# Patient Record
Sex: MALE | Race: WHITE | NOT HISPANIC OR LATINO | Employment: OTHER | URBAN - METROPOLITAN AREA
[De-identification: names, ages, dates, MRNs, and addresses within clinical notes are randomized per-mention and may not be internally consistent; named-entity substitution may affect disease eponyms.]

---

## 2017-04-17 ENCOUNTER — APPOINTMENT (OUTPATIENT)
Dept: ADMISSIONS | Facility: MEDICAL CENTER | Age: 62
End: 2017-04-17
Attending: ORTHOPAEDIC SURGERY
Payer: COMMERCIAL

## 2017-04-17 RX ORDER — ACETAMINOPHEN 500 MG
500-1000 TABLET ORAL
COMMUNITY

## 2017-04-17 RX ORDER — LORATADINE 10 MG/1
10 CAPSULE, LIQUID FILLED ORAL
COMMUNITY
End: 2021-06-17

## 2017-04-20 ENCOUNTER — HOSPITAL ENCOUNTER (OUTPATIENT)
Facility: MEDICAL CENTER | Age: 62
End: 2017-04-20
Attending: ORTHOPAEDIC SURGERY | Admitting: ORTHOPAEDIC SURGERY
Payer: COMMERCIAL

## 2017-04-20 VITALS
RESPIRATION RATE: 16 BRPM | HEART RATE: 66 BPM | DIASTOLIC BLOOD PRESSURE: 97 MMHG | BODY MASS INDEX: 31.82 KG/M2 | SYSTOLIC BLOOD PRESSURE: 151 MMHG | HEIGHT: 73 IN | OXYGEN SATURATION: 99 % | WEIGHT: 240.08 LBS | TEMPERATURE: 96.8 F

## 2017-04-20 PROBLEM — G56.02 CARPAL TUNNEL SYNDROME ON LEFT: Status: ACTIVE | Noted: 2017-04-20

## 2017-04-20 PROCEDURE — 160048 HCHG OR STATISTICAL LEVEL 1-5: Performed by: ORTHOPAEDIC SURGERY

## 2017-04-20 PROCEDURE — A9270 NON-COVERED ITEM OR SERVICE: HCPCS

## 2017-04-20 PROCEDURE — 500070 HCHG BLADE, AGEE CARPAL TUNNEL: Performed by: ORTHOPAEDIC SURGERY

## 2017-04-20 PROCEDURE — 700111 HCHG RX REV CODE 636 W/ 250 OVERRIDE (IP): Performed by: ORTHOPAEDIC SURGERY

## 2017-04-20 PROCEDURE — 500761 HCHG KIT, CARPAL TUNNEL ENDOSCOPIC: Performed by: ORTHOPAEDIC SURGERY

## 2017-04-20 PROCEDURE — 110371 HCHG SHELL REV 272: Performed by: ORTHOPAEDIC SURGERY

## 2017-04-20 PROCEDURE — 160029 HCHG SURGERY MINUTES - 1ST 30 MINS LEVEL 4: Performed by: ORTHOPAEDIC SURGERY

## 2017-04-20 PROCEDURE — 700101 HCHG RX REV CODE 250

## 2017-04-20 PROCEDURE — 160046 HCHG PACU - 1ST 60 MINS PHASE II: Performed by: ORTHOPAEDIC SURGERY

## 2017-04-20 PROCEDURE — 501838 HCHG SUTURE GENERAL: Performed by: ORTHOPAEDIC SURGERY

## 2017-04-20 PROCEDURE — 110382 HCHG SHELL REV 271: Performed by: ORTHOPAEDIC SURGERY

## 2017-04-20 PROCEDURE — 700111 HCHG RX REV CODE 636 W/ 250 OVERRIDE (IP)

## 2017-04-20 PROCEDURE — 160041 HCHG SURGERY MINUTES - EA ADDL 1 MIN LEVEL 4: Performed by: ORTHOPAEDIC SURGERY

## 2017-04-20 PROCEDURE — 160035 HCHG PACU - 1ST 60 MINS PHASE I: Performed by: ORTHOPAEDIC SURGERY

## 2017-04-20 PROCEDURE — 160002 HCHG RECOVERY MINUTES (STAT): Performed by: ORTHOPAEDIC SURGERY

## 2017-04-20 PROCEDURE — 700102 HCHG RX REV CODE 250 W/ 637 OVERRIDE(OP)

## 2017-04-20 PROCEDURE — 502576 HCHG PACK, HAND: Performed by: ORTHOPAEDIC SURGERY

## 2017-04-20 PROCEDURE — 160009 HCHG ANES TIME/MIN: Performed by: ORTHOPAEDIC SURGERY

## 2017-04-20 PROCEDURE — 160025 RECOVERY II MINUTES (STATS): Performed by: ORTHOPAEDIC SURGERY

## 2017-04-20 PROCEDURE — 501480 HCHG STOCKINETTE: Performed by: ORTHOPAEDIC SURGERY

## 2017-04-20 PROCEDURE — A4606 OXYGEN PROBE USED W OXIMETER: HCPCS | Performed by: ORTHOPAEDIC SURGERY

## 2017-04-20 RX ORDER — SODIUM CHLORIDE, SODIUM LACTATE, POTASSIUM CHLORIDE, CALCIUM CHLORIDE 600; 310; 30; 20 MG/100ML; MG/100ML; MG/100ML; MG/100ML
INJECTION, SOLUTION INTRAVENOUS
Status: DISCONTINUED | OUTPATIENT
Start: 2017-04-20 | End: 2017-04-20 | Stop reason: HOSPADM

## 2017-04-20 RX ORDER — OXYCODONE HCL 5 MG/5 ML
SOLUTION, ORAL ORAL
Status: COMPLETED
Start: 2017-04-20 | End: 2017-04-20

## 2017-04-20 RX ORDER — BUPIVACAINE HYDROCHLORIDE 5 MG/ML
INJECTION, SOLUTION EPIDURAL; INTRACAUDAL
Status: DISCONTINUED | OUTPATIENT
Start: 2017-04-20 | End: 2017-04-20 | Stop reason: HOSPADM

## 2017-04-20 RX ORDER — LIDOCAINE HYDROCHLORIDE 10 MG/ML
INJECTION, SOLUTION INFILTRATION; PERINEURAL
Status: COMPLETED
Start: 2017-04-20 | End: 2017-04-20

## 2017-04-20 RX ADMIN — OXYCODONE HYDROCHLORIDE 10 MG: 5 SOLUTION ORAL at 08:40

## 2017-04-20 RX ADMIN — SODIUM CHLORIDE, POTASSIUM CHLORIDE, SODIUM LACTATE AND CALCIUM CHLORIDE: 600; 310; 30; 20 INJECTION, SOLUTION INTRAVENOUS at 06:55

## 2017-04-20 RX ADMIN — LIDOCAINE HYDROCHLORIDE 0.2 ML: 10 INJECTION, SOLUTION INFILTRATION; PERINEURAL at 06:55

## 2017-04-20 ASSESSMENT — PAIN SCALES - GENERAL
PAINLEVEL_OUTOF10: 0
PAINLEVEL_OUTOF10: ASSUMED PAIN PRESENT
PAINLEVEL_OUTOF10: 0

## 2017-04-20 NOTE — OR NURSING
0900 patient to stage 2  Patient settled in recliner chair post short ambulation from irais - pt dressed with assist by CNA. Dressing to L hand CDI and pt putting on socks and shoes with bilat hands even after RN educating about elevating L hand. Pt states understanding but continues to use bilateral hands even after offer of assistance from RN/CNA to put on shoes. Denies pain and nausea.   0910 Dr Wallace here and instruction patient to keep L hand elevated; pt demonstrates understanding.

## 2017-04-20 NOTE — OR NURSING
0802 received from or  Oral airway intact  resp spont  Left hand dressing c/d/i  Fingers warm  Cap refill<3 sec    Elevated  Ice pack applied  0840  Meets discharge criteria

## 2017-04-20 NOTE — OP REPORT
DATE OF SERVICE:  04/20/2017    PREOPERATIVE DIAGNOSIS:  Left carpal tunnel syndrome with thenar atrophy.    POSTOPERATIVE DIAGNOSIS:  Left carpal tunnel syndrome with thenar atrophy.    PROCEDURES PERFORMED:  Left endoscopic carpal tunnel release and wrist block.    SURGEON:  Reji Wallace MD    ANESTHESIOLOGIST:  Aaron Olmedo MD    ANESTHESIA:  General LMA.    COMPLICATIONS:  None.    DRAINS:  None.    SPECIMENS:  None.    TOURNIQUET TIME:  9 minutes at 250 mmHg.    ESTIMATED BLOOD LOSS:  None.    FLUIDS:  Crystalloid and Kefzol.    INDICATIONS:  The patient has had ongoing problems with carpal tunnel.  He is   conservatively without success.  Positive EMGs.  The risks, benefits, and   options were discussed and wished to undergo the above procedure.    FINDINGS:  Noted to be a tight transcarpal ligament.  This was well visualized   completely released.  Distal forearm fascia was completely released and was   noted to be quite thickened.    PROCEDURE IN DETAIL:  The patient was brought to operating room, general   anesthesia was induced, all pressure points well padded.  Left upper extremity   was prepped and draped in usual fashion.  Timeout was called.    The arm was exsanguinated with Esmarch and tourniquet inflated.    Over the proximal wrist crease, sharp dissection was carried through skin and   blunt excision of subcutaneous tissues.  Adequate hemostasis was obtained with   electrocautery.    Distally based flap was made at transcarpal ligament.    Using the Mann elevator, dilator and scope, transcarpal ligament was   visualized.  There was initially some synovial tissue still radially.  This is   the elevator was then used to remove this and releases.  Staying ulnar,   multiple passes were made getting complete release of the transcarpal ligament   with marked gaping of the 2 cut edges.    This was probed and noted to be completely released.    The flap was excised.  Proximally blunt excision was carried  out superficial   and deep to the forearm fascia.  This was cut under direct visualization,   probed and noted to be completely released.    The wound was copiously irrigated.  A running 4-0 subcuticular stitch was   placed.  Steri-Strips were applied.  Proximally, wrist blocks placed in the   median and ulnar nerves 0.5% Marcaine with epinephrine.    Bulky dressing was applied.  Tourniquet was released.  The patient brought to   recovery in stable condition, tolerated, no complications.  The plan is for   the patient to be elevating his left upper extremity, remove his dressing in 3   days.  Place a Band-Aid over the wound.  Keep the wound clean and dry until   the sutures removed.       ____________________________________     MD LUKE BOOTH / JUSTINA    DD:  04/20/2017 08:10:37  DT:  04/20/2017 09:28:57    D#:  [id number]  Job#:  [id number]

## 2017-04-20 NOTE — IP AVS SNAPSHOT
4/20/2017    Triston Nelson  [address] [place] [address]    Dear Triston:    Counts include 234 beds at the [facility] wants to ensure your discharge home is safe and you or your loved ones have had all of your questions answered regarding your care after you leave the hospital.    Below is a list of resources and contact information should you have any questions regarding your hospital stay, follow-up instructions, or active medical symptoms.    Questions or Concerns Regarding… Contact   Medical Questions Related to Your Discharge  (7 days a week, 8am-5pm) Contact a Nurse Care Coordinator   [phone]   Medical Questions Not Related to Your Discharge  (24 hours a day / 7 days a week)  Contact the Nurse Health Line   [phone]    Medications or Discharge Instructions Refer to your discharge packet   or contact your [facility] Primary Care Provider   [phone]   Follow-up Appointment(s) Schedule your appointment via Penxy   or contact Scheduling [phone]   Billing Review your statement via Penxy  or contact Billing [phone]   Medical Records Review your records via Penxy   or contact Medical Records [phone]     You may receive a telephone call within two days of discharge. This call is to make certain you understand your discharge instructions and have the opportunity to have any questions answered. You can also easily access your medical information, test results and upcoming appointments via the Penxy free online health management tool. You can learn more and sign up at [employer] [place]/Penxy. For assistance setting up your Penxy account, please call [phone].    Once again, we want to ensure your discharge home is safe and that you have a clear understanding of any next steps in your care. If you have any questions or concerns, please do not hesitate to contact us, we are here for you. Thank you for choosing [facility] for your healthcare needs.    Sincerely,    Your [facility] Healthcare Team

## 2017-04-20 NOTE — OR NURSING
0630 PT TO PRE OP TO ASSUME CARE 0610 Patient allergies and NPO status verified, home medication reconciliation completed and belongings secured. Patient verbalizes understanding of pain scale, expected course of stay and plan of care. Surgical site verified with patient. IV access established.

## 2017-04-20 NOTE — IP AVS SNAPSHOT
" Home Care Instructions                                                                                                                Name:Triston Nelson  Medical Record Number:[id number]  CSN: [id number]    YOB: 1955   Age: 62 y.o.  Sex: male  HT:1.854 m (6' 1\") WT: 108.9 kg (240 lb 1.3 oz)          Admit Date: 4/20/2017     Discharge Date:   Today's Date: 4/20/2017  Attending Doctor:  Reji Wallace M.D.                  Allergies:  Shellfish allergy                Discharge Instructions         ACTIVITY: Rest and take it easy for the first 24 hours.  A responsible adult is recommended to remain with you during that time.  It is normal to feel sleepy.  We encourage you to not do anything that requires balance, judgment or coordination.    MILD FLU-LIKE SYMPTOMS ARE NORMAL. YOU MAY EXPERIENCE GENERALIZED MUSCLE ACHES, THROAT IRRITATION, HEADACHE AND/OR SOME NAUSEA.    FOR 24 HOURS DO NOT:  Drive, operate machinery or run household appliances.  Drink beer or alcoholic beverages.   Make important decisions or sign legal documents.    SPECIAL INSTRUCTIONS:   Move fingers in dressing  Keep operative upper extremity elevated  Keep clean and dry  May remove dressing and place bandaid on post op day 3              DIET: To avoid nausea, slowly advance diet as tolerated, avoiding spicy or greasy foods for the first day.  Add more substantial food to your diet according to your physician's instructions.  Babies can be fed formula or breast milk as soon as they are hungry.  INCREASE FLUIDS AND FIBER TO AVOID CONSTIPATION.        FOLLOW-UP APPOINTMENT:  A follow-up appointment should be arranged with your doctor in  Call to schedule.    You should CALL YOUR PHYSICIAN if you develop:  Fever greater than 101 degrees F.  Pain not relieved by medication, or persistent nausea or vomiting.  Excessive bleeding (blood soaking through dressing) or unexpected drainage from the wound.  Extreme redness or swelling around the " incision site, drainage of pus or foul smelling drainage.  Inability to urinate or empty your bladder within 8 hours.  Problems with breathing or chest pain.    You should call 911 if you develop problems with breathing or chest pain.  If you are unable to contact your doctor or surgical center, you should go to the nearest emergency room or urgent care center.  Physician's telephone #: [phone]    If any questions arise, call your doctor.  If your doctor is not available, please feel free to call the Surgical Center at [phone].  The Center is open Monday through Friday from 7AM to 7PM.  You can also call the [facility] HOTLINE open 24 hours/day, 7 days/week and speak to a nurse at [phone], or toll free at [phone].    A registered nurse may call you a few days after your surgery to see how you are doing after your procedure.    MEDICATIONS: Resume taking daily medication.  Take prescribed pain medication with food.  If no medication is prescribed, you may take non-aspirin pain medication if needed.  PAIN MEDICATION CAN BE VERY CONSTIPATING.  Take a stool softener or laxative such as senokot, pericolace, or milk of magnesia if needed.    Prescription at home  Last pain medication given at     If your physician has prescribed pain medication that includes Acetaminophen (Tylenol), do not take additional Acetaminophen (Tylenol) while taking the prescribed medication.    Depression / Suicide Risk    As you are discharged from this [facility] facility, it is important to learn how to keep safe from harming yourself.    Recognize the warning signs:  · Abrupt changes in personality, positive or negative- including increase in energy   · Giving away possessions  · Change in eating patterns- significant weight changes-  positive or negative  · Change in sleeping patterns- unable to sleep or sleeping all the time   · Unwillingness or inability to communicate  · Depression  · Unusual sadness, discouragement  and loneliness  · Talk of wanting to die  · Neglect of personal appearance   · Rebelliousness- reckless behavior  · Withdrawal from people/activities they love  · Confusion- inability to concentrate     If you or a loved one observes any of these behaviors or has concerns about self-harm, here's what you can do:  · Talk about it- your feelings and reasons for harming yourself  · Remove any means that you might use to hurt yourself (examples: pills, rope, extension cords, firearm)  · Get professional help from the community (Mental Health, Substance Abuse, psychological counseling)  · Do not be alone:Call your Safe Contact- someone whom you trust who will be there for you.  · Call your local CRISIS HOTLINE 136-3500 or 260-547-6145  · Call your local Children's Mobile Crisis Response Team [place] (174) 124-4329 or www.[employer]  · Call the toll free National Suicide Prevention Hotlines   · National Suicide Prevention Lifeline 805-569-LXMO (9106)  · Agent Partner Line Network 800-SUICIDE (301-2535)       Medication List      ASK your doctor about these medications        Instructions    Morning Afternoon Evening Bedtime    acetaminophen 500 MG Tabs   Commonly known as:  TYLENOL        Take 500-1,000 mg by mouth every bedtime.   Dose:  500-1000 mg                        EQ SUPHEDRINE PE PO        Take  by mouth.                        Loratadine 10 MG Caps        Take 10 mg by mouth.   Dose:  10 mg                        Potassium 99 MG Tabs        Take 99 mg by mouth every day.   Dose:  99 mg                                Medication Information     Above and/or attached are the medications your physician expects you to take upon discharge. Review all of your home medications and newly ordered medications with your doctor and/or pharmacist. Follow medication instructions as directed by your doctor and/or pharmacist. Please keep your medication list with you and share with your physician. Update the  information when medications are discontinued, doses are changed, or new medications (including over-the-counter products) are added; and carry medication information at all times in the event of emergency situations.        Resources     Quit Smoking / Tobacco Use:    I understand the use of any tobacco products increases my chance of suffering from future heart disease or stroke and could cause other illnesses which may shorten my life. Quitting the use of tobacco products is the single most important thing I can do to improve my health. For further information on smoking / tobacco cessation call a Toll Free Quit Line at 1-734.105.7301 (*[employer] [place]) or 1-237.520.5107 (American Lung Association) or you can access the web based program at www.lungusa.org.    [place] Tobacco Users Help Line:  (216) 438-7020       Toll Free: 1-392.784.9204  Quit Tobacco Program [facility] (521)027-2939    Crisis Hotline:    [place] Crisis Hotline:  4-651-PAPUAFW or 1-894.836.8380    [place] Crisis Hotline:    1-365.963.9872 or 769-536-9598    Discharge Survey:   Thank you for choosing [facility]. We hope we did everything we could to make your hospital stay a pleasant one. You may be receiving a survey and we would appreciate your time and participation in answering the questions. Your input is very valuable to us in our efforts to improve our service to our patients and their families.            Signatures     My signature on this form indicates that:    1. I acknowledge receipt and understanding of these Home Care Instruction.  2. My questions regarding this information have been answered to my satisfaction.  3. I have formulated a plan with my discharge nurse to obtain my prescribed medications for home.    __________________________________      __________________________________                   Patient Signature                                 Guardian/Responsible Adult  Signature      __________________________________                 __________       ________                       Nurse Signature                                               Date                 Time

## 2017-04-20 NOTE — DISCHARGE INSTRUCTIONS
ACTIVITY: Rest and take it easy for the first 24 hours.  A responsible adult is recommended to remain with you during that time.  It is normal to feel sleepy.  We encourage you to not do anything that requires balance, judgment or coordination.    MILD FLU-LIKE SYMPTOMS ARE NORMAL. YOU MAY EXPERIENCE GENERALIZED MUSCLE ACHES, THROAT IRRITATION, HEADACHE AND/OR SOME NAUSEA.    FOR 24 HOURS DO NOT:  Drive, operate machinery or run household appliances.  Drink beer or alcoholic beverages.   Make important decisions or sign legal documents.    SPECIAL INSTRUCTIONS:   Move fingers in dressing  Keep operative upper extremity elevated  Keep clean and dry  May remove dressing and place bandaid on post op day 3              DIET: To avoid nausea, slowly advance diet as tolerated, avoiding spicy or greasy foods for the first day.  Add more substantial food to your diet according to your physician's instructions.  Babies can be fed formula or breast milk as soon as they are hungry.  INCREASE FLUIDS AND FIBER TO AVOID CONSTIPATION.        FOLLOW-UP APPOINTMENT:  A follow-up appointment should be arranged with your doctor in  Call to schedule.    You should CALL YOUR PHYSICIAN if you develop:  Fever greater than 101 degrees F.  Pain not relieved by medication, or persistent nausea or vomiting.  Excessive bleeding (blood soaking through dressing) or unexpected drainage from the wound.  Extreme redness or swelling around the incision site, drainage of pus or foul smelling drainage.  Inability to urinate or empty your bladder within 8 hours.  Problems with breathing or chest pain.    You should call 911 if you develop problems with breathing or chest pain.  If you are unable to contact your doctor or surgical center, you should go to the nearest emergency room or urgent care center.  Physician's telephone #: [phone]    If any questions arise, call your doctor.  If your doctor is not available, please feel free to call the  Surgical Center at [phone].  The Center is open Monday through Friday from 7AM to 7PM.  You can also call the HEALTH HOTLINE open 24 hours/day, 7 days/week and speak to a nurse at [phone], or toll free at [phone].    A registered nurse may call you a few days after your surgery to see how you are doing after your procedure.    MEDICATIONS: Resume taking daily medication.  Take prescribed pain medication with food.  If no medication is prescribed, you may take non-aspirin pain medication if needed.  PAIN MEDICATION CAN BE VERY CONSTIPATING.  Take a stool softener or laxative such as senokot, pericolace, or milk of magnesia if needed.    Prescription at home  Last pain medication given at     If your physician has prescribed pain medication that includes Acetaminophen (Tylenol), do not take additional Acetaminophen (Tylenol) while taking the prescribed medication.    Depression / Suicide Risk    As you are discharged from this [facility] facility, it is important to learn how to keep safe from harming yourself.    Recognize the warning signs:  · Abrupt changes in personality, positive or negative- including increase in energy   · Giving away possessions  · Change in eating patterns- significant weight changes-  positive or negative  · Change in sleeping patterns- unable to sleep or sleeping all the time   · Unwillingness or inability to communicate  · Depression  · Unusual sadness, discouragement and loneliness  · Talk of wanting to die  · Neglect of personal appearance   · Rebelliousness- reckless behavior  · Withdrawal from people/activities they love  · Confusion- inability to concentrate     If you or a loved one observes any of these behaviors or has concerns about self-harm, here's what you can do:  · Talk about it- your feelings and reasons for harming yourself  · Remove any means that you might use to hurt yourself (examples: pills, rope, extension cords, firearm)  · Get professional  help from the community (Mental Health, Substance Abuse, psychological counseling)  · Do not be alone:Call your Safe Contact- someone whom you trust who will be there for you.  · Call your local CRISIS HOTLINE 937-7150 or 430-401-9718  · Call your local Children's Mobile Crisis Response Team [place] (664) 339-9249 or www.[employer]  · Call the toll free National Suicide Prevention Hotlines   · National Suicide Prevention Lifeline 199-093-ASAR (3919)  · National Hope Line Network 800-SUICIDE (354-9892)

## 2017-07-13 ENCOUNTER — APPOINTMENT (OUTPATIENT)
Dept: ADMISSIONS | Facility: MEDICAL CENTER | Age: 62
End: 2017-07-13
Attending: ORTHOPAEDIC SURGERY
Payer: COMMERCIAL

## 2017-07-18 ENCOUNTER — HOSPITAL ENCOUNTER (OUTPATIENT)
Facility: MEDICAL CENTER | Age: 62
End: 2017-07-18
Attending: ORTHOPAEDIC SURGERY | Admitting: ORTHOPAEDIC SURGERY
Payer: COMMERCIAL

## 2017-07-18 VITALS
OXYGEN SATURATION: 98 % | BODY MASS INDEX: 32.43 KG/M2 | TEMPERATURE: 97.5 F | WEIGHT: 244.71 LBS | RESPIRATION RATE: 16 BRPM | HEIGHT: 73 IN | HEART RATE: 59 BPM

## 2017-07-18 PROBLEM — G56.01 CARPAL TUNNEL SYNDROME ON RIGHT: Status: ACTIVE | Noted: 2017-07-18

## 2017-07-18 PROCEDURE — 700101 HCHG RX REV CODE 250

## 2017-07-18 PROCEDURE — 160009 HCHG ANES TIME/MIN: Performed by: ORTHOPAEDIC SURGERY

## 2017-07-18 PROCEDURE — 160048 HCHG OR STATISTICAL LEVEL 1-5: Performed by: ORTHOPAEDIC SURGERY

## 2017-07-18 PROCEDURE — 500070 HCHG BLADE, AGEE CARPAL TUNNEL: Performed by: ORTHOPAEDIC SURGERY

## 2017-07-18 PROCEDURE — 500881 HCHG PACK, EXTREMITY: Performed by: ORTHOPAEDIC SURGERY

## 2017-07-18 PROCEDURE — 160036 HCHG PACU - EA ADDL 30 MINS PHASE I: Performed by: ORTHOPAEDIC SURGERY

## 2017-07-18 PROCEDURE — 500126 HCHG BOVIE, NEEDLE TIP: Performed by: ORTHOPAEDIC SURGERY

## 2017-07-18 PROCEDURE — 502576 HCHG PACK, HAND: Performed by: ORTHOPAEDIC SURGERY

## 2017-07-18 PROCEDURE — 501838 HCHG SUTURE GENERAL: Performed by: ORTHOPAEDIC SURGERY

## 2017-07-18 PROCEDURE — 160029 HCHG SURGERY MINUTES - 1ST 30 MINS LEVEL 4: Performed by: ORTHOPAEDIC SURGERY

## 2017-07-18 PROCEDURE — 160002 HCHG RECOVERY MINUTES (STAT): Performed by: ORTHOPAEDIC SURGERY

## 2017-07-18 PROCEDURE — 302135 SEQUENTIAL COMPRESSION MACHINE: Performed by: ORTHOPAEDIC SURGERY

## 2017-07-18 PROCEDURE — 700111 HCHG RX REV CODE 636 W/ 250 OVERRIDE (IP)

## 2017-07-18 PROCEDURE — 160041 HCHG SURGERY MINUTES - EA ADDL 1 MIN LEVEL 4: Performed by: ORTHOPAEDIC SURGERY

## 2017-07-18 PROCEDURE — 501480 HCHG STOCKINETTE: Performed by: ORTHOPAEDIC SURGERY

## 2017-07-18 PROCEDURE — 160035 HCHG PACU - 1ST 60 MINS PHASE I: Performed by: ORTHOPAEDIC SURGERY

## 2017-07-18 RX ORDER — LIDOCAINE HYDROCHLORIDE 10 MG/ML
INJECTION, SOLUTION INFILTRATION; PERINEURAL
Status: COMPLETED
Start: 2017-07-18 | End: 2017-07-18

## 2017-07-18 RX ORDER — BUPIVACAINE HYDROCHLORIDE 5 MG/ML
INJECTION, SOLUTION EPIDURAL; INTRACAUDAL
Status: DISCONTINUED | OUTPATIENT
Start: 2017-07-18 | End: 2017-07-18 | Stop reason: HOSPADM

## 2017-07-18 RX ORDER — SODIUM CHLORIDE, SODIUM LACTATE, POTASSIUM CHLORIDE, CALCIUM CHLORIDE 600; 310; 30; 20 MG/100ML; MG/100ML; MG/100ML; MG/100ML
1000 INJECTION, SOLUTION INTRAVENOUS
Status: DISCONTINUED | OUTPATIENT
Start: 2017-07-18 | End: 2017-07-18 | Stop reason: HOSPADM

## 2017-07-18 RX ADMIN — SODIUM CHLORIDE, SODIUM LACTATE, POTASSIUM CHLORIDE, CALCIUM CHLORIDE 1000 ML: 600; 310; 30; 20 INJECTION, SOLUTION INTRAVENOUS at 06:10

## 2017-07-18 RX ADMIN — LIDOCAINE HYDROCHLORIDE 0.1 ML: 10 INJECTION, SOLUTION INFILTRATION; PERINEURAL at 06:10

## 2017-07-18 ASSESSMENT — PAIN SCALES - GENERAL
PAINLEVEL_OUTOF10: 0

## 2017-07-18 NOTE — OR NURSING
0802 received from or  resp spont oral airway dc'd  Fingers warm  Cap refill<3 sec  Good movement  No c/o [id number] remains painfree  Meets discharge criteria RN

## 2017-07-18 NOTE — IP AVS SNAPSHOT
" Home Care Instructions                                                                                                                Name:Triston Nelson  Medical Record Number:[id number]  CSN: [id number]    YOB: 1955   Age: 62 y.o.  Sex: male  HT:1.854 m (6' 0.99\") WT: 111 kg (244 lb 11.4 oz)          Admit Date: 7/18/2017     Discharge Date:   Today's Date: 7/18/2017  Attending Doctor:  Reji Wallace M.D.                  Allergies:  Shellfish allergy                Discharge Instructions         ACTIVITY: Rest and take it easy for the first 24 hours.  A responsible adult is recommended to remain with you during that time.  It is normal to feel sleepy.  We encourage you to not do anything that requires balance, judgment or coordination.    MILD FLU-LIKE SYMPTOMS ARE NORMAL. YOU MAY EXPERIENCE GENERALIZED MUSCLE ACHES, THROAT IRRITATION, HEADACHE AND/OR SOME NAUSEA.    FOR 24 HOURS DO NOT:  Drive, operate machinery or run household appliances.  Drink beer or alcoholic beverages.   Make important decisions or sign legal documents.    SPECIAL INSTRUCTIONS: Move fingers in dressing   Keep operative upper extremity elevated   Keep clean and dry   May remove dressing and place bandaid on post op day     DIET: To avoid nausea, slowly advance diet as tolerated, avoiding spicy or greasy foods for the first day.  Add more substantial food to your diet according to your physician's instructions.  Babies can be fed formula or breast milk as soon as they are hungry.  INCREASE FLUIDS AND FIBER TO AVOID CONSTIPATION.      FOLLOW-UP APPOINTMENT:  A follow-up appointment should be arranged with your doctor in ***; call to schedule.    You should CALL YOUR PHYSICIAN if you develop:  Fever greater than 101 degrees F.  Pain not relieved by medication, or persistent nausea or vomiting.  Excessive bleeding (blood soaking through dressing) or unexpected drainage from the wound.  Extreme redness or swelling around the " incision site, drainage of pus or foul smelling drainage.  Inability to urinate or empty your bladder within 8 hours.  Problems with breathing or chest pain.    You should call 911 if you develop problems with breathing or chest pain.  If you are unable to contact your doctor or surgical center, you should go to the nearest emergency room or urgent care center.  Physician's telephone #: [phone]    If any questions arise, call your doctor.  If your doctor is not available, please feel free to call the Surgical Center at [phone].  The Center is open Monday through Friday from 7AM to 7PM.  You can also call the [facility] HOTLINE open 24 hours/day, 7 days/week and speak to a nurse at [phone], or toll free at [phone].    A registered nurse may call you a few days after your surgery to see how you are doing after your procedure.    MEDICATIONS: Resume taking daily medication.  Take prescribed pain medication with food.  If no medication is prescribed, you may take non-aspirin pain medication if needed.  PAIN MEDICATION CAN BE VERY CONSTIPATING.  Take a stool softener or laxative such as senokot, pericolace, or milk of magnesia if needed.    Prescription at home.  Last pain medication given at     If your physician has prescribed pain medication that includes Acetaminophen (Tylenol), do not take additional Acetaminophen (Tylenol) while taking the prescribed medication.    Depression / Suicide Risk    As you are discharged from this [facility] facility, it is important to learn how to keep safe from harming yourself.    Recognize the warning signs:  · Abrupt changes in personality, positive or negative- including increase in energy   · Giving away possessions  · Change in eating patterns- significant weight changes-  positive or negative  · Change in sleeping patterns- unable to sleep or sleeping all the time   · Unwillingness or inability to communicate  · Depression  · Unusual sadness, discouragement  and loneliness  · Talk of wanting to die  · Neglect of personal appearance   · Rebelliousness- reckless behavior  · Withdrawal from people/activities they love  · Confusion- inability to concentrate     If you or a loved one observes any of these behaviors or has concerns about self-harm, here's what you can do:  · Talk about it- your feelings and reasons for harming yourself  · Remove any means that you might use to hurt yourself (examples: pills, rope, extension cords, firearm)  · Get professional help from the community (Mental Health, Substance Abuse, psychological counseling)  · Do not be alone:Call your Safe Contact- someone whom you trust who will be there for you.  · Call your local CRISIS HOTLINE 316-0555 or 284-155-0010  · Call your local Children's Mobile Crisis Response Team [place] (697) 572-1984 or www.[employer]  · Call the toll free National Suicide Prevention Hotlines   · National Suicide Prevention Lifeline 581-095-GTBL (7114)  · [employer] Line Network 800-SUICIDE (521-2824)       Medication List      ASK your doctor about these medications        Instructions    Morning Afternoon Evening Bedtime    acetaminophen 500 MG Tabs   Commonly known as:  TYLENOL        Take 500-1,000 mg by mouth every bedtime.   Dose:  500-1000 mg                        EQ SUPHEDRINE PE PO        Take  by mouth as needed.                        Loratadine 10 MG Caps        Take 10 mg by mouth.   Dose:  10 mg                        Potassium 99 MG Tabs        Take 99 mg by mouth every day.   Dose:  99 mg                                Medication Information     Above and/or attached are the medications your physician expects you to take upon discharge. Review all of your home medications and newly ordered medications with your doctor and/or pharmacist. Follow medication instructions as directed by your doctor and/or pharmacist. Please keep your medication list with you and share with your physician. Update  the information when medications are discontinued, doses are changed, or new medications (including over-the-counter products) are added; and carry medication information at all times in the event of emergency situations.        Resources     Quit Smoking / Tobacco Use:    I understand the use of any tobacco products increases my chance of suffering from future heart disease or stroke and could cause other illnesses which may shorten my life. Quitting the use of tobacco products is the single most important thing I can do to improve my health. For further information on smoking / tobacco cessation call a Toll Free Quit Line at 1-278.571.5327 (*[employer] [place]) or 1-915.907.1976 (American Lung Association) or you can access the web based program at www.lungusa.org.    [place] Tobacco Users Help Line:  (253) 446-5980       Toll Free: 1-967.941.9604  Quit Tobacco Program [facility] (067)847-0461    Crisis Hotline:    [place] Crisis Hotline:  8-183-ASCYGHK or 1-769.123.9709    [place] Crisis Hotline:    1-179.537.7823 or 255-024-3889    Discharge Survey:   Thank you for choosing [facility]. We hope we did everything we could to make your hospital stay a pleasant one. You may be receiving a survey and we would appreciate your time and participation in answering the questions. Your input is very valuable to us in our efforts to improve our service to our patients and their families.            Signatures     My signature on this form indicates that:    1. I acknowledge receipt and understanding of these Home Care Instruction.  2. My questions regarding this information have been answered to my satisfaction.  3. I have formulated a plan with my discharge nurse to obtain my prescribed medications for home.    __________________________________      __________________________________                   Patient Signature                                 Guardian/Responsible Adult  Signature      __________________________________                 __________       ________                       Nurse Signature                                               Date                 Time

## 2017-07-18 NOTE — IP AVS SNAPSHOT
7/18/2017    Triston Nelson  [address] [place] [address]    Dear Triston:    [facility] wants to ensure your discharge home is safe and you or your loved ones have had all of your questions answered regarding your care after you leave the hospital.    Below is a list of resources and contact information should you have any questions regarding your hospital stay, follow-up instructions, or active medical symptoms.    Questions or Concerns Regarding… Contact   Medical Questions Related to Your Discharge  (7 days a week, 8am-5pm) Contact a Nurse Care Coordinator   [phone]   Medical Questions Not Related to Your Discharge  (24 hours a day / 7 days a week)  Contact the Nurse Health Line   [phone]    Medications or Discharge Instructions Refer to your discharge packet   or contact your [facility] Primary Care Provider   [phone]   Follow-up Appointment(s) Schedule your appointment via [employer]   or contact Scheduling [phone]   Billing Review your statement via [employer]  or contact Billing [phone]   Medical Records Review your records via [employer]   or contact Medical Records [phone]     You may receive a telephone call within two days of discharge. This call is to make certain you understand your discharge instructions and have the opportunity to have any questions answered. You can also easily access your medical information, test results and upcoming appointments via the [employer] free online health management tool. You can learn more and sign up at [employer]. For assistance setting up your [employer] account, please call [phone].    Once again, we want to ensure your discharge home is safe and that you have a clear understanding of any next steps in your care. If you have any questions or concerns, please do not hesitate to contact us, we are here for you. Thank you for choosing [facility] for your healthcare needs.    Sincerely,    Your [facility] Healthcare Team

## 2017-07-18 NOTE — OP REPORT
Operative Report    Date: 7/18/2017    Surgeon: Reji Wallace      Anesthesiologist: Ezio    Pre-operative Diagnosis: Carpal tunnel syndrome    Post-operative Diagnosis: Same    Procedure: Endoscopic carpal tunnel release    Wrist block    Indications:   Carpal tunnel syndrome    Findings: Tight transverse carpal ligament. Multiple passes were made. Complete release with opening the transverse carpal ligament staying ulnar.    Procedure in detail: Patient brought to the operating room. Gen. seizure was induced. All pressure points were well-padded.    The right upper extremity was prepped and draped. Timeout was called. The arm was exsanguinated with Esmarch and the tourniquet inflated. Proximal incision was made over the proximal wrist crease. Sharp dissection was carried at the skin. Blunt dissection was carried out through the deep tissues. Adequate hemostasis was obtained with electrocautery. A distally based flap was made. Using the BRITTNEY scope dilators and elevators, the transverse carpal ligament was visualized. Multiple passes were made. There was fogging of the scope. Separate passes had to be made. Ultimately a complete release was performed which was probed and noted be complete release.    Proximally blunt dissection carried out superficial and deep to the distal forearm fascia was cut under direct visualization. This was probed and also noted be complete release.    The wound was copiously and briskly irrigated. A running subcuticular 4-0 Prolene was placed. Proximally a wrist block was placed after placing Steri-Strips. A bulky dressing was applied and the tourniquet was released. There is excellent capillary refill.    The patient was brought to the recovery room in stable condition. He tolerated the procedure well.    EBL: 0.    Tourniquet time 12 minutes    Drains: None    Dispo: Home. Keeping the right upper extremity elevated. Removing the dressing in 3 days in place and a Band-Aid.

## 2018-12-13 ENCOUNTER — HOSPITAL ENCOUNTER (OUTPATIENT)
Dept: RADIOLOGY | Facility: MEDICAL CENTER | Age: 63
End: 2018-12-13
Attending: PHYSICIAN ASSISTANT
Payer: COMMERCIAL

## 2018-12-13 DIAGNOSIS — M25.552 HIP PAIN, LEFT: ICD-10-CM

## 2018-12-13 PROCEDURE — 27093 INJECTION FOR HIP X-RAY: CPT | Mod: LT

## 2018-12-13 PROCEDURE — 700117 HCHG RX CONTRAST REV CODE 255: Performed by: RADIOLOGY

## 2018-12-13 PROCEDURE — A9585 GADOBUTROL INJECTION: HCPCS | Performed by: RADIOLOGY

## 2018-12-13 PROCEDURE — 73722 MRI JOINT OF LWR EXTR W/DYE: CPT | Mod: LT

## 2018-12-13 RX ORDER — GADOBUTROL 604.72 MG/ML
2 INJECTION INTRAVENOUS ONCE
Status: COMPLETED | OUTPATIENT
Start: 2018-12-13 | End: 2018-12-13

## 2018-12-13 RX ADMIN — GADOBUTROL 0.2 ML: 604.72 INJECTION INTRAVENOUS at 14:30

## 2018-12-13 RX ADMIN — IOHEXOL 10 ML: 300 INJECTION, SOLUTION INTRAVENOUS at 14:30

## 2019-01-11 ENCOUNTER — HOSPITAL ENCOUNTER (OUTPATIENT)
Dept: RADIOLOGY | Facility: MEDICAL CENTER | Age: 64
End: 2019-01-11
Attending: FAMILY MEDICINE
Payer: COMMERCIAL

## 2019-01-11 DIAGNOSIS — M79.642 PAIN OF LEFT HAND: ICD-10-CM

## 2019-01-11 DIAGNOSIS — M79.641 PAIN OF RIGHT HAND: ICD-10-CM

## 2019-01-11 PROCEDURE — 73120 X-RAY EXAM OF HAND: CPT | Mod: RT

## 2021-03-03 DIAGNOSIS — Z23 NEED FOR VACCINATION: ICD-10-CM

## 2021-05-18 ENCOUNTER — HOSPITAL ENCOUNTER (OUTPATIENT)
Dept: RADIOLOGY | Facility: MEDICAL CENTER | Age: 66
End: 2021-05-18
Attending: FAMILY MEDICINE
Payer: COMMERCIAL

## 2021-05-18 DIAGNOSIS — K40.90 UNILATERAL INGUINAL HERNIA WITHOUT OBSTRUCTION OR GANGRENE, RECURRENCE NOT SPECIFIED: ICD-10-CM

## 2021-05-18 PROCEDURE — 76857 US EXAM PELVIC LIMITED: CPT

## 2021-06-17 ENCOUNTER — PRE-ADMISSION TESTING (OUTPATIENT)
Dept: ADMISSIONS | Facility: MEDICAL CENTER | Age: 66
End: 2021-06-17
Attending: SURGERY
Payer: COMMERCIAL

## 2021-06-17 DIAGNOSIS — Z01.812 PRE-OPERATIVE LABORATORY EXAMINATION: ICD-10-CM

## 2021-06-17 LAB
ANION GAP SERPL CALC-SCNC: 13 MMOL/L (ref 7–16)
BUN SERPL-MCNC: 20 MG/DL (ref 8–22)
CALCIUM SERPL-MCNC: 9.4 MG/DL (ref 8.4–10.2)
CHLORIDE SERPL-SCNC: 101 MMOL/L (ref 96–112)
CO2 SERPL-SCNC: 23 MMOL/L (ref 20–33)
CREAT SERPL-MCNC: 0.74 MG/DL (ref 0.5–1.4)
EKG IMPRESSION: NORMAL
GLUCOSE SERPL-MCNC: 263 MG/DL (ref 65–99)
POTASSIUM SERPL-SCNC: 4.1 MMOL/L (ref 3.6–5.5)
SODIUM SERPL-SCNC: 137 MMOL/L (ref 135–145)

## 2021-06-17 PROCEDURE — 93010 ELECTROCARDIOGRAM REPORT: CPT | Performed by: INTERNAL MEDICINE

## 2021-06-17 PROCEDURE — 80048 BASIC METABOLIC PNL TOTAL CA: CPT

## 2021-06-17 PROCEDURE — 36415 COLL VENOUS BLD VENIPUNCTURE: CPT

## 2021-06-17 PROCEDURE — 93005 ELECTROCARDIOGRAM TRACING: CPT

## 2021-06-17 PROCEDURE — 83036 HEMOGLOBIN GLYCOSYLATED A1C: CPT

## 2021-06-17 RX ORDER — AMLODIPINE BESYLATE AND BENAZEPRIL HYDROCHLORIDE 5; 10 MG/1; MG/1
CAPSULE ORAL
COMMUNITY
Start: 2021-04-20 | End: 2021-06-17

## 2021-06-17 RX ORDER — ROSUVASTATIN CALCIUM 10 MG/1
TABLET, COATED ORAL
COMMUNITY
Start: 2021-04-19

## 2021-06-17 RX ORDER — METFORMIN HYDROCHLORIDE 750 MG/1
TABLET, EXTENDED RELEASE ORAL
COMMUNITY
Start: 2021-04-28

## 2021-06-17 RX ORDER — AMLODIPINE BESYLATE AND BENAZEPRIL HYDROCHLORIDE 5; 20 MG/1; MG/1
CAPSULE ORAL
COMMUNITY
Start: 2021-04-28

## 2021-06-17 NOTE — PREPROCEDURE INSTRUCTIONS
"Pre-admit appointment completed. \"Preparing for your Procedure\" sheet given to Pt with verbal and written instructions. Pt states all instructions given are understood and to call pre-admit or Dr's office for additional questions or any symptoms of illness/covid develop prior to DOS. Medications the patient will take the morning of surgery per anesthesia protocol: None  Instructed to take prescription medicines through the day before surgery.    Denies anesthesia complications  Viewed the \"Preparing for Your Surgery\" video.        "

## 2021-06-18 LAB
EST. AVERAGE GLUCOSE BLD GHB EST-MCNC: 183 MG/DL
HBA1C MFR BLD: 8 % (ref 4–5.6)

## 2021-06-25 ENCOUNTER — HOSPITAL ENCOUNTER (OUTPATIENT)
Facility: MEDICAL CENTER | Age: 66
End: 2021-06-25
Attending: SURGERY | Admitting: SURGERY
Payer: COMMERCIAL

## 2021-06-25 ENCOUNTER — ANESTHESIA (OUTPATIENT)
Dept: SURGERY | Facility: MEDICAL CENTER | Age: 66
End: 2021-06-25
Payer: COMMERCIAL

## 2021-06-25 ENCOUNTER — ANESTHESIA EVENT (OUTPATIENT)
Dept: SURGERY | Facility: MEDICAL CENTER | Age: 66
End: 2021-06-25
Payer: COMMERCIAL

## 2021-06-25 VITALS
RESPIRATION RATE: 16 BRPM | SYSTOLIC BLOOD PRESSURE: 136 MMHG | HEART RATE: 72 BPM | DIASTOLIC BLOOD PRESSURE: 78 MMHG | WEIGHT: 247.36 LBS | HEIGHT: 73 IN | TEMPERATURE: 97.2 F | BODY MASS INDEX: 32.78 KG/M2 | OXYGEN SATURATION: 94 %

## 2021-06-25 DIAGNOSIS — G89.18 POSTOPERATIVE PAIN: ICD-10-CM

## 2021-06-25 LAB — GLUCOSE BLD-MCNC: 192 MG/DL (ref 65–99)

## 2021-06-25 PROCEDURE — 502714 HCHG ROBOTIC SURGERY SERVICES: Performed by: SURGERY

## 2021-06-25 PROCEDURE — 160046 HCHG PACU - 1ST 60 MINS PHASE II: Performed by: SURGERY

## 2021-06-25 PROCEDURE — 700101 HCHG RX REV CODE 250: Performed by: SURGERY

## 2021-06-25 PROCEDURE — 700105 HCHG RX REV CODE 258: Performed by: SURGERY

## 2021-06-25 PROCEDURE — 160025 RECOVERY II MINUTES (STATS): Performed by: SURGERY

## 2021-06-25 PROCEDURE — 500868 HCHG NEEDLE, SURGI(VARES): Performed by: SURGERY

## 2021-06-25 PROCEDURE — 160002 HCHG RECOVERY MINUTES (STAT): Performed by: SURGERY

## 2021-06-25 PROCEDURE — 160029 HCHG SURGERY MINUTES - 1ST 30 MINS LEVEL 4: Performed by: SURGERY

## 2021-06-25 PROCEDURE — 160048 HCHG OR STATISTICAL LEVEL 1-5: Performed by: SURGERY

## 2021-06-25 PROCEDURE — 160035 HCHG PACU - 1ST 60 MINS PHASE I: Performed by: SURGERY

## 2021-06-25 PROCEDURE — A9270 NON-COVERED ITEM OR SERVICE: HCPCS | Performed by: ANESTHESIOLOGY

## 2021-06-25 PROCEDURE — 501838 HCHG SUTURE GENERAL: Performed by: SURGERY

## 2021-06-25 PROCEDURE — 700102 HCHG RX REV CODE 250 W/ 637 OVERRIDE(OP): Performed by: ANESTHESIOLOGY

## 2021-06-25 PROCEDURE — 82962 GLUCOSE BLOOD TEST: CPT

## 2021-06-25 PROCEDURE — 64488 TAP BLOCK BI INJECTION: CPT | Performed by: SURGERY

## 2021-06-25 PROCEDURE — 700101 HCHG RX REV CODE 250: Performed by: ANESTHESIOLOGY

## 2021-06-25 PROCEDURE — 500002 HCHG ADHESIVE, DERMABOND: Performed by: SURGERY

## 2021-06-25 PROCEDURE — 160041 HCHG SURGERY MINUTES - EA ADDL 1 MIN LEVEL 4: Performed by: SURGERY

## 2021-06-25 PROCEDURE — 160036 HCHG PACU - EA ADDL 30 MINS PHASE I: Performed by: SURGERY

## 2021-06-25 PROCEDURE — 160009 HCHG ANES TIME/MIN: Performed by: SURGERY

## 2021-06-25 PROCEDURE — C1781 MESH (IMPLANTABLE): HCPCS | Performed by: SURGERY

## 2021-06-25 PROCEDURE — 700111 HCHG RX REV CODE 636 W/ 250 OVERRIDE (IP): Performed by: ANESTHESIOLOGY

## 2021-06-25 DEVICE — MESH LAP PROGRIP FLAT SHEET 16 X 12: Type: IMPLANTABLE DEVICE | Site: ABDOMEN | Status: FUNCTIONAL

## 2021-06-25 DEVICE — MESH PROGRIP LAPROSCOPIC SELF FIXATING (1/CA): Type: IMPLANTABLE DEVICE | Site: ABDOMEN | Status: FUNCTIONAL

## 2021-06-25 RX ORDER — BUPIVACAINE HYDROCHLORIDE AND EPINEPHRINE 2.5; 5 MG/ML; UG/ML
INJECTION, SOLUTION EPIDURAL; INFILTRATION; INTRACAUDAL; PERINEURAL
Status: COMPLETED | OUTPATIENT
Start: 2021-06-25 | End: 2021-06-25

## 2021-06-25 RX ORDER — ONDANSETRON 2 MG/ML
INJECTION INTRAMUSCULAR; INTRAVENOUS PRN
Status: DISCONTINUED | OUTPATIENT
Start: 2021-06-25 | End: 2021-06-25 | Stop reason: SURG

## 2021-06-25 RX ORDER — MIDAZOLAM HYDROCHLORIDE 1 MG/ML
INJECTION INTRAMUSCULAR; INTRAVENOUS PRN
Status: DISCONTINUED | OUTPATIENT
Start: 2021-06-25 | End: 2021-06-25 | Stop reason: SURG

## 2021-06-25 RX ORDER — HYDROMORPHONE HYDROCHLORIDE 1 MG/ML
0.2 INJECTION, SOLUTION INTRAMUSCULAR; INTRAVENOUS; SUBCUTANEOUS
Status: DISCONTINUED | OUTPATIENT
Start: 2021-06-25 | End: 2021-06-25 | Stop reason: HOSPADM

## 2021-06-25 RX ORDER — DEXAMETHASONE SODIUM PHOSPHATE 4 MG/ML
INJECTION, SOLUTION INTRA-ARTICULAR; INTRALESIONAL; INTRAMUSCULAR; INTRAVENOUS; SOFT TISSUE PRN
Status: DISCONTINUED | OUTPATIENT
Start: 2021-06-25 | End: 2021-06-25 | Stop reason: SURG

## 2021-06-25 RX ORDER — LIDOCAINE HYDROCHLORIDE 20 MG/ML
INJECTION, SOLUTION EPIDURAL; INFILTRATION; INTRACAUDAL; PERINEURAL PRN
Status: DISCONTINUED | OUTPATIENT
Start: 2021-06-25 | End: 2021-06-25 | Stop reason: SURG

## 2021-06-25 RX ORDER — OXYCODONE HCL 5 MG/5 ML
10 SOLUTION, ORAL ORAL
Status: COMPLETED | OUTPATIENT
Start: 2021-06-25 | End: 2021-06-25

## 2021-06-25 RX ORDER — CEFAZOLIN SODIUM 1 G/3ML
INJECTION, POWDER, FOR SOLUTION INTRAMUSCULAR; INTRAVENOUS PRN
Status: DISCONTINUED | OUTPATIENT
Start: 2021-06-25 | End: 2021-06-25 | Stop reason: SURG

## 2021-06-25 RX ORDER — DIPHENHYDRAMINE HYDROCHLORIDE 50 MG/ML
12.5 INJECTION INTRAMUSCULAR; INTRAVENOUS
Status: DISCONTINUED | OUTPATIENT
Start: 2021-06-25 | End: 2021-06-25 | Stop reason: HOSPADM

## 2021-06-25 RX ORDER — KETOROLAC TROMETHAMINE 30 MG/ML
INJECTION, SOLUTION INTRAMUSCULAR; INTRAVENOUS PRN
Status: DISCONTINUED | OUTPATIENT
Start: 2021-06-25 | End: 2021-06-25 | Stop reason: SURG

## 2021-06-25 RX ORDER — SODIUM CHLORIDE, SODIUM LACTATE, POTASSIUM CHLORIDE, CALCIUM CHLORIDE 600; 310; 30; 20 MG/100ML; MG/100ML; MG/100ML; MG/100ML
INJECTION, SOLUTION INTRAVENOUS CONTINUOUS
Status: DISCONTINUED | OUTPATIENT
Start: 2021-06-25 | End: 2021-06-25 | Stop reason: HOSPADM

## 2021-06-25 RX ORDER — OXYCODONE HYDROCHLORIDE 5 MG/1
5 TABLET ORAL EVERY 4 HOURS PRN
Qty: 20 TABLET | Refills: 0 | Status: SHIPPED | OUTPATIENT
Start: 2021-06-25 | End: 2021-06-29

## 2021-06-25 RX ORDER — OXYCODONE HCL 5 MG/5 ML
5 SOLUTION, ORAL ORAL
Status: COMPLETED | OUTPATIENT
Start: 2021-06-25 | End: 2021-06-25

## 2021-06-25 RX ORDER — HYDROMORPHONE HYDROCHLORIDE 1 MG/ML
0.4 INJECTION, SOLUTION INTRAMUSCULAR; INTRAVENOUS; SUBCUTANEOUS
Status: DISCONTINUED | OUTPATIENT
Start: 2021-06-25 | End: 2021-06-25 | Stop reason: HOSPADM

## 2021-06-25 RX ORDER — MEPERIDINE HYDROCHLORIDE 25 MG/ML
12.5 INJECTION INTRAMUSCULAR; INTRAVENOUS; SUBCUTANEOUS
Status: DISCONTINUED | OUTPATIENT
Start: 2021-06-25 | End: 2021-06-25 | Stop reason: HOSPADM

## 2021-06-25 RX ORDER — BUPIVACAINE HYDROCHLORIDE AND EPINEPHRINE 5; 5 MG/ML; UG/ML
INJECTION, SOLUTION EPIDURAL; INTRACAUDAL; PERINEURAL
Status: DISCONTINUED | OUTPATIENT
Start: 2021-06-25 | End: 2021-06-25 | Stop reason: HOSPADM

## 2021-06-25 RX ORDER — ONDANSETRON 2 MG/ML
4 INJECTION INTRAMUSCULAR; INTRAVENOUS
Status: DISCONTINUED | OUTPATIENT
Start: 2021-06-25 | End: 2021-06-25 | Stop reason: HOSPADM

## 2021-06-25 RX ORDER — HALOPERIDOL 5 MG/ML
1 INJECTION INTRAMUSCULAR
Status: DISCONTINUED | OUTPATIENT
Start: 2021-06-25 | End: 2021-06-25 | Stop reason: HOSPADM

## 2021-06-25 RX ORDER — HYDROMORPHONE HYDROCHLORIDE 1 MG/ML
0.1 INJECTION, SOLUTION INTRAMUSCULAR; INTRAVENOUS; SUBCUTANEOUS
Status: DISCONTINUED | OUTPATIENT
Start: 2021-06-25 | End: 2021-06-25 | Stop reason: HOSPADM

## 2021-06-25 RX ADMIN — MIDAZOLAM HYDROCHLORIDE 2 MG: 1 INJECTION, SOLUTION INTRAMUSCULAR; INTRAVENOUS at 07:49

## 2021-06-25 RX ADMIN — ROCURONIUM BROMIDE 80 MG: 10 INJECTION, SOLUTION INTRAVENOUS at 07:53

## 2021-06-25 RX ADMIN — SUGAMMADEX 200 MG: 100 INJECTION, SOLUTION INTRAVENOUS at 09:02

## 2021-06-25 RX ADMIN — LIDOCAINE HYDROCHLORIDE 0.5 ML: 10 INJECTION, SOLUTION INFILTRATION; PERINEURAL at 06:50

## 2021-06-25 RX ADMIN — CEFAZOLIN 2 G: 330 INJECTION, POWDER, FOR SOLUTION INTRAMUSCULAR; INTRAVENOUS at 09:13

## 2021-06-25 RX ADMIN — DEXAMETHASONE SODIUM PHOSPHATE 4 MG: 4 INJECTION, SOLUTION INTRAMUSCULAR; INTRAVENOUS at 07:53

## 2021-06-25 RX ADMIN — SODIUM CHLORIDE, POTASSIUM CHLORIDE, SODIUM LACTATE AND CALCIUM CHLORIDE: 600; 310; 30; 20 INJECTION, SOLUTION INTRAVENOUS at 06:50

## 2021-06-25 RX ADMIN — LIDOCAINE HYDROCHLORIDE 100 MG: 20 INJECTION, SOLUTION EPIDURAL; INFILTRATION; INTRACAUDAL; PERINEURAL at 07:53

## 2021-06-25 RX ADMIN — KETOROLAC TROMETHAMINE 30 MG: 30 INJECTION, SOLUTION INTRAMUSCULAR at 07:53

## 2021-06-25 RX ADMIN — PROPOFOL 150 MG: 10 INJECTION, EMULSION INTRAVENOUS at 07:53

## 2021-06-25 RX ADMIN — FENTANYL CITRATE 100 MCG: 50 INJECTION, SOLUTION INTRAMUSCULAR; INTRAVENOUS at 07:49

## 2021-06-25 RX ADMIN — OXYCODONE HYDROCHLORIDE 5 MG: 5 SOLUTION ORAL at 09:36

## 2021-06-25 RX ADMIN — BUPIVACAINE HYDROCHLORIDE AND EPINEPHRINE 30 ML: 2.5; 5 INJECTION, SOLUTION EPIDURAL; INFILTRATION; INTRACAUDAL; PERINEURAL at 08:56

## 2021-06-25 RX ADMIN — ONDANSETRON 4 MG: 2 INJECTION INTRAMUSCULAR; INTRAVENOUS at 07:53

## 2021-06-25 RX ADMIN — WATER 15 ML: 100 IRRIGANT IRRIGATION at 06:51

## 2021-06-25 ASSESSMENT — PAIN DESCRIPTION - PAIN TYPE
TYPE: SURGICAL PAIN
TYPE: SURGICAL PAIN

## 2021-06-25 ASSESSMENT — PAIN SCALES - GENERAL: PAIN_LEVEL: 1

## 2021-06-25 NOTE — OR NURSING
0906: To PACU from OR via gurney, sleeping, respirations spontaneous and non-labored via OPA. Pt restless, needs reorientation. Dermabond reapplied to one lap site, 3 lap sites open to air, c/d/i.  0920: pt restless, needs reorientation, states groin is sore, falls asleep quickly, denies nausea.  0935: pt complaining of pain, plan analgesia.  Report to Adri JAY.

## 2021-06-25 NOTE — OR SURGEON
Immediate Post OP Note    PreOp Diagnosis: BIH      PostOp Diagnosis: same direct      Procedure(s):  REPAIR, HERNIA, INGUINAL, ROBOT-ASSISTED, USING DA MAUREEN XI - WITH MESH PLACEMENT. - Wound Class: Clean Contaminated    Surgeon(s):  LUIS Justice M.D.    Anesthesiologist/Type of Anesthesia:  Anesthesiologist: Hussein Herman M.D./General    Surgical Staff:  Circulator: Pat Sifuentes R.N.  Relief Circulator: Manjula Espinosa R.N.  Scrub Person: Ronaldo Ball    Specimens removed if any:  * No specimens in log *    Estimated Blood Loss: minb    Findings: same    Complications: 0        6/25/2021 9:09 AM Brad Pandey M.D.

## 2021-06-25 NOTE — OR NURSING
0960 assumed care of pt, awake, taking po fluids    1000 awake, alert, states pain at a tolerable level, called family with update    1009 Meets phase 2 criteria, report called to kg rn    1022 transferred to phase 2 in stable condition via gurney w/cna

## 2021-06-25 NOTE — DISCHARGE INSTRUCTIONS
ACTIVITY: Rest and take it easy for the first 24 hours.  A responsible adult is recommended to remain with you during that time.  It is normal to feel sleepy.  We encourage you to not do anything that requires balance, judgment or coordination.    MILD FLU-LIKE SYMPTOMS ARE NORMAL. YOU MAY EXPERIENCE GENERALIZED MUSCLE ACHES, THROAT IRRITATION, HEADACHE AND/OR SOME NAUSEA.    FOR 24 HOURS DO NOT:  Drive, operate machinery or run household appliances.  Drink beer or alcoholic beverages.   Make important decisions or sign legal documents.    SPECIAL INSTRUCTIONS: See handout    DIET: To avoid nausea, slowly advance diet as tolerated, avoiding spicy or greasy foods for the first day.  Add more substantial food to your diet according to your physician's instructions.  Babies can be fed formula or breast milk as soon as they are hungry.  INCREASE FLUIDS AND FIBER TO AVOID CONSTIPATION.      FOLLOW-UP APPOINTMENT:  A follow-up appointment should be arranged with your doctor; call to schedule.    You should CALL YOUR PHYSICIAN if you develop:  Fever greater than 101 degrees F.  Pain not relieved by medication, or persistent nausea or vomiting.  Excessive bleeding (blood soaking through dressing) or unexpected drainage from the wound.  Extreme redness or swelling around the incision site, drainage of pus or foul smelling drainage.  Inability to urinate or empty your bladder within 8 hours.  Problems with breathing or chest pain.    You should call 911 if you develop problems with breathing or chest pain.  If you are unable to contact your doctor or surgical center, you should go to the nearest emergency room or urgent care center.  Physician's telephone #: Dr. Pandey [phone]    If any questions arise, call your doctor.  If your doctor is not available, please feel free to call the Surgical Center at [phone]. The Contact Center is open Monday through Friday 7AM to 5PM and may speak to a nurse at [phone], or  toll free at [phone].     A registered nurse may call you a few days after your surgery to see how you are doing after your procedure.    MEDICATIONS: Resume taking daily medication.  Take prescribed pain medication with food.  If no medication is prescribed, you may take non-aspirin pain medication if needed.  PAIN MEDICATION CAN BE VERY CONSTIPATING.  Take a stool softener or laxative such as senokot, pericolace, or milk of magnesia if needed.    Prescription given prior to surgery.  Last pain medication given at 9:36am 5mg oxycodone.    If your physician has prescribed pain medication that includes Acetaminophen (Tylenol), do not take additional Acetaminophen (Tylenol) while taking the prescribed medication.    Depression / Suicide Risk    As you are discharged from this [facility] facility, it is important to learn how to keep safe from harming yourself.    Recognize the warning signs:  · Abrupt changes in personality, positive or negative- including increase in energy   · Giving away possessions  · Change in eating patterns- significant weight changes-  positive or negative  · Change in sleeping patterns- unable to sleep or sleeping all the time   · Unwillingness or inability to communicate  · Depression  · Unusual sadness, discouragement and loneliness  · Talk of wanting to die  · Neglect of personal appearance   · Rebelliousness- reckless behavior  · Withdrawal from people/activities they love  · Confusion- inability to concentrate     If you or a loved one observes any of these behaviors or has concerns about self-harm, here's what you can do:  · Talk about it- your feelings and reasons for harming yourself  · Remove any means that you might use to hurt yourself (examples: pills, rope, extension cords, firearm)  · Get professional help from the community (Mental Health, Substance Abuse, psychological counseling)  · Do not be alone:Call your Safe Contact- someone whom you trust who will be there for  you.  · Call your local CRISIS HOTLINE 211-2864 or 049-641-8165  · Call your local Children's Mobile Crisis Response Team [place] (613) 676-1623 or www.[employer]  · Call the toll free National Suicide Prevention Hotlines   · National Suicide Prevention Lifeline 395-294-XPPV (5788)  · National [employer] Line Network 800-SUICIDE (619-6480)

## 2021-06-25 NOTE — OP REPORT
DATE OF SERVICE:  06/25/2021     PREOPERATIVE DIAGNOSIS:  Bilateral inguinal hernias.     POSTOPERATIVE DIAGNOSIS:  Direct bilateral inguinal hernias.     OPERATION:  Robotic-assisted laparoscopic transabdominal preperitoneal repair   of bilateral direct inguinal hernias with ProGrip mesh.     SURGEON:  Brad Pandey MD     ANESTHESIOLOGIST:  Dr. Herman.     ANESTHESIA:   General anesthesia.     ASSISTANT:  Dr. Beck.     OPERATIVE NOTE:  The patient is 66 years of age, presents with bilateral   inguinal hernias.  It is larger on the left than the right.  The patient was   recommended to undergo simultaneous repair.  The risks, possible complications   of which were described to him in detail.  He agreed to proceed with   robotic-assisted laparoscopic repair, signed consents for surgery and   anesthesia, had a satisfactory preoperative evaluation, received detailed   postoperative care instructions including a prescription for oxycodone IR 5 mg   #20 as a component of multimodal postoperative analgesia.  This was   prescribed in compliance with [place] regulation.  The patient did receive   prophylactic antibiotics, had sequential stockings applied as anti-embolism   prophylaxis.     DESCRIPTION OF PROCEDURE:  The patient was taken to the operating room and   placed under anesthesia by Dr. Herman.  Once anesthetized, his abdomen was   prepped with ChloraPrep solution, sterile drapes were applied and a time-out   was affected.  A solution of 0.5% Marcaine with epinephrine was liberally   infiltrated in all incisions.  An incision was made in the left upper   quadrant.  The fascia was elevated.  This was pierced with a Veress needle.    Saline drop test was permissive to proceed with step pneumo insufflation.    Once fully pneumo insufflated, an 8 mm trocar was placed, this facilitated   placement of an 8 mm trocar in the right upper quadrant and one in the   paramedian position on the right lower epigastrium.   The patient was then   positioned in Trendelenburg.  He had bilateral hernias, the left was   incarcerated with omentum.  The patient had bilateral transversus abdominis   blocks performed with 0.5% Marcaine with epinephrine 10 mL.  The patient then   had the da Domenico robot brought in and docked.  Instrumentation and camera   introduced.  Dr. Pandey retired to the console.  Transverse incisions were made   above the pelvic outlet on the left.  This was dissected down across the   pubic symphysis and Boni's ligament medially over to the femoral vein.  He   had a large direct hernia, which was reduced for containing omentum and then   the hernia sac was reduced.  The patient had lateral dissection carried over   to the medial border of the psoas muscle.  The cord was fully mobilized.    There was a small lipoma of the cord, which was reduced from the internal ring   and the peritoneum was fully mobilized to the point where the cord structures   were lay flat against the pelvic sidewall.  A similar dissection was   performed on the right.  Here, the direct inguinal hernia was smaller.  Once   these dissections were completed, ProGrip mesh was inserted, a 12 x 16 cm on   the left, 10 x 15 cm on the right.  The peritoneum was closed over it using   running 2-0 Stratafix sutures.  Once the repairs were reperitonealized, the   patient had the suture needles retrieved and the procedure was completed.  The   patient had the instruments withdrawn.  The robot undocked.  The trocars   removed.  The skin closed using running 4-0 Monocryl subcuticular and the   wounds were sealed with Dermabond.  The patient was awakened, extubated and   taken to recovery room in stable and satisfactory condition.  The estimated   blood loss was truly minimal.  Sponge and needle counts were reported as   correct.  The patient tolerated the procedure well and will be treated on an   ambulatory basis, has received detailed postoperative care  instructions.    Should his recovery deviate from that expected, he should call promptly.    Followup was arranged in the office in 1 week.        ______________________________  MD PB NGUYEN/VAISHALI/[facility] [place]    DD:  06/25/2021 09:14  DT:  06/25/2021 09:44    Job#:  [id number]    CC:_____ Batsheva

## 2021-06-25 NOTE — OR NURSING
1022: Report received from Adri JAY. Pt to Stage 2 from PACU via rPigeon. Assisted to get dressed by CNA. VSS.     1040: DC instructions reviewed with pt and pt's wife.     1051: PIV DC'd by RN. Pt discharged via wheelchair by CNA. All belongings with pt.

## 2021-06-25 NOTE — ANESTHESIA PROCEDURE NOTES
Airway    Date/Time: 6/25/2021 7:53 AM  Performed by: Hussein Herman M.D.  Authorized by: Hussein Herman M.D.     Location:  OR  Urgency:  Elective  Indications for Airway Management:  Anesthesia      Spontaneous Ventilation: absent    Sedation Level:  Deep  Preoxygenated: Yes    Patient Position:  Sniffing  Final Airway Type:  Endotracheal airway  Final Endotracheal Airway:  ETT  Cuffed: Yes    Technique Used for Successful ETT Placement:  Direct laryngoscopy    Insertion Site:  Oral  Blade Type:  Francis  Laryngoscope Blade/Videolaryngoscope Blade Size:  3  ETT Size (mm):  7.5  Measured from:  Teeth  ETT to Teeth (cm):  21  Placement Verified by: auscultation and capnometry    Cormack-Lehane Classification:  Grade I - full view of glottis  Number of Attempts at Approach:  1

## 2021-06-25 NOTE — ANESTHESIA PROCEDURE NOTES
Peripheral Block    Date/Time: 6/25/2021 8:56 AM  Performed by: Hussein Herman M.D.  Authorized by: Hussein Herman M.D.     Start Time:  6/25/2021 8:56 AM  End Time:  6/25/2021 8:59 AM  Reason for Block: at surgeon's request and post-op pain management ONLY    patient identified, IV checked, site marked, risks and benefits discussed, surgical consent, monitors and equipment checked, pre-op evaluation and timeout performed    Patient Position:  Supine  Prep: ChloraPrep    Monitoring:  Heart rate, continuous pulse ox and cardiac monitor  Block Region:  Trunk  Trunk - Block Type:  Abdominal plane block - TAP block    Laterality:  Bilateral  Procedures: ultrasound guided  Image captured, interpreted and electronically stored.  Local Infiltration:  Lidocaine  Strength:  1 %  Dose:  3 ml  Block Type:  Single-shot  Needle Length:  100mm  Needle Gauge:  21 G  Needle Localization:  Ultrasound guidance  Injection Assessment:  Negative aspiration for heme, no paresthesia on injection, incremental injection and local visualized surrounding nerve on ultrasound

## 2021-06-25 NOTE — ANESTHESIA TIME REPORT
Anesthesia Start and Stop Event Times     Date Time Event    6/25/2021 0736 Ready for Procedure     0743 Anesthesia Start     0912 Anesthesia Stop        Responsible Staff  06/25/21    Name Role Begin End    Hussein Herman M.D. Anesth 0743 0912        Preop Diagnosis (Free Text):  Pre-op Diagnosis     BILATERAL INGUINAL HERNIAS        Preop Diagnosis (Codes):    Post op Diagnosis  Bilateral direct inguinal hernia      Premium Reason  Non-Premium    Comments: block

## 2021-06-25 NOTE — ANESTHESIA POSTPROCEDURE EVALUATION
Patient: Triston Nelson    Procedure Summary     Date: 06/25/21 Room / Location:  OR  / SURGERY [facility]    Anesthesia Start: 0743 Anesthesia Stop: 0912    Procedure: REPAIR, HERNIA, INGUINAL, ROBOT-ASSISTED, USING DA MAUREEN XI - WITH MESH PLACEMENT. (Bilateral Abdomen) Diagnosis: (BILATERAL INGUINAL HERNIAS)    Surgeons: Brad Pandey M.D. Responsible Provider: Hussein Herman M.D.    Anesthesia Type: general, peripheral nerve block ASA Status: 2          Final Anesthesia Type: general, peripheral nerve block  Last vitals  BP   Blood Pressure : 148/90    Temp   36 °C (96.8 °F)    Pulse   74   Resp   16    SpO2   95 %      Anesthesia Post Evaluation    Patient location during evaluation: PACU  Patient participation: complete - patient participated  Level of consciousness: awake and alert  Pain score: 1    Airway patency: patent  Anesthetic complications: no  Cardiovascular status: hemodynamically stable  Respiratory status: acceptable  Hydration status: euvolemic    PONV: none          No complications documented.     Nurse Pain Score: 0 (NPRS)

## 2023-10-16 NOTE — DISCHARGE INSTRUCTIONS
ACTIVITY: Rest and take it easy for the first 24 hours.  A responsible adult is recommended to remain with you during that time.  It is normal to feel sleepy.  We encourage you to not do anything that requires balance, judgment or coordination.    MILD FLU-LIKE SYMPTOMS ARE NORMAL. YOU MAY EXPERIENCE GENERALIZED MUSCLE ACHES, THROAT IRRITATION, HEADACHE AND/OR SOME NAUSEA.    FOR 24 HOURS DO NOT:  Drive, operate machinery or run household appliances.  Drink beer or alcoholic beverages.   Make important decisions or sign legal documents.    SPECIAL INSTRUCTIONS: Move fingers in dressing   Keep operative upper extremity elevated   Keep clean and dry   May remove dressing and place bandaid on post op day     DIET: To avoid nausea, slowly advance diet as tolerated, avoiding spicy or greasy foods for the first day.  Add more substantial food to your diet according to your physician's instructions.  Babies can be fed formula or breast milk as soon as they are hungry.  INCREASE FLUIDS AND FIBER TO AVOID CONSTIPATION.      FOLLOW-UP APPOINTMENT:  A follow-up appointment should be arranged with your doctor in call to schedule.    You should CALL YOUR PHYSICIAN if you develop:  Fever greater than 101 degrees F.  Pain not relieved by medication, or persistent nausea or vomiting.  Excessive bleeding (blood soaking through dressing) or unexpected drainage from the wound.  Extreme redness or swelling around the incision site, drainage of pus or foul smelling drainage.  Inability to urinate or empty your bladder within 8 hours.  Problems with breathing or chest pain.    You should call 911 if you develop problems with breathing or chest pain.  If you are unable to contact your doctor or surgical center, you should go to the nearest emergency room or urgent care center.  Physician's telephone #: [phone]    If any questions arise, call your doctor.  If your doctor is not available, please feel free to call the Surgical Center at  [phone].  The Center is open Monday through Friday from 7AM to 7PM.  You can also call the HEALTH HOTLINE open 24 hours/day, 7 days/week and speak to a nurse at [phone], or toll free at [phone].    A registered nurse may call you a few days after your surgery to see how you are doing after your procedure.    MEDICATIONS: Resume taking daily medication.  Take prescribed pain medication with food.  If no medication is prescribed, you may take non-aspirin pain medication if needed.  PAIN MEDICATION CAN BE VERY CONSTIPATING.  Take a stool softener or laxative such as senokot, pericolace, or milk of magnesia if needed.    Prescription at home.  Last pain medication given at     If your physician has prescribed pain medication that includes Acetaminophen (Tylenol), do not take additional Acetaminophen (Tylenol) while taking the prescribed medication.    Depression / Suicide Risk    As you are discharged from this [facility] facility, it is important to learn how to keep safe from harming yourself.    Recognize the warning signs:  · Abrupt changes in personality, positive or negative- including increase in energy   · Giving away possessions  · Change in eating patterns- significant weight changes-  positive or negative  · Change in sleeping patterns- unable to sleep or sleeping all the time   · Unwillingness or inability to communicate  · Depression  · Unusual sadness, discouragement and loneliness  · Talk of wanting to die  · Neglect of personal appearance   · Rebelliousness- reckless behavior  · Withdrawal from people/activities they love  · Confusion- inability to concentrate     If you or a loved one observes any of these behaviors or has concerns about self-harm, here's what you can do:  · Talk about it- your feelings and reasons for harming yourself  · Remove any means that you might use to hurt yourself (examples: pills, rope, extension cords, firearm)  · Get professional help from the  community (Mental Health, Substance Abuse, psychological counseling)  · Do not be alone:Call your Safe Contact- someone whom you trust who will be there for you.  · Call your local CRISIS HOTLINE 147-1513 or 853-765-6473  · Call your local Children's Mobile Crisis Response Team [place] (441) 858-3242 or www.[facility]  · Call the toll free National Suicide Prevention Hotlines   · National Suicide Prevention Lifeline 837-335-IEOH (7551)  · National Hope Line Network 800-SUICIDE (015-7027)   5

## 2024-11-13 ENCOUNTER — HOSPITAL ENCOUNTER (OUTPATIENT)
Dept: LAB | Facility: MEDICAL CENTER | Age: 69
End: 2024-11-13
Attending: FAMILY MEDICINE
Payer: COMMERCIAL

## 2024-11-13 LAB — PSA SERPL DL<=0.01 NG/ML-MCNC: 1.98 NG/ML (ref 0–4)

## 2024-11-13 PROCEDURE — 36415 COLL VENOUS BLD VENIPUNCTURE: CPT

## 2024-11-13 PROCEDURE — 84153 ASSAY OF PSA TOTAL: CPT

## (undated) DEVICE — BLADE SURGICAL #15 - (50/BX 3BX/CA)

## (undated) DEVICE — TOURNIQUET CUFF 18 X 3 ONE PORT DISP - STERILE (10/BX)

## (undated) DEVICE — MASK ANESTHESIA ADULT  - (100/CA)

## (undated) DEVICE — GLOVE BIOGEL PI ORTHO SZ 8.5 PF LF (40/BX)

## (undated) DEVICE — SUTURE GENERAL

## (undated) DEVICE — GLOVE BIOGEL SZ 8 SURGICAL PF LTX - (50PR/BX 4BX/CA)

## (undated) DEVICE — KIT ANESTHESIA W/CIRCUIT & 3/LT BAG W/FILTER (20EA/CA)

## (undated) DEVICE — TOURNIQUET, STERILE 18 (RED)

## (undated) DEVICE — CLOSURE SKIN STRIP 1/2 X 4 IN - (STERI STRIP) (50/BX 4BX/CA)

## (undated) DEVICE — GLOVE BIOGEL PI ORTHO SZ 8 PF LF (40PR/BX)

## (undated) DEVICE — ELECTRODE 850 FOAM ADHESIVE - HYDROGEL RADIOTRNSPRNT (50/PK)

## (undated) DEVICE — SODIUM CHL IRRIGATION 0.9% 1000ML (12EA/CA)

## (undated) DEVICE — SUCTION INSTRUMENT YANKAUER BULBOUS TIP W/O VENT (50EA/CA)

## (undated) DEVICE — LACTATED RINGERS INJ 1000 ML - (14EA/CA 60CA/PF)

## (undated) DEVICE — TOWEL STOP TIMEOUT SAFETY FLAG (40EA/CA)

## (undated) DEVICE — GLOVE BIOGEL SZ 7.5 SURGICAL PF LTX - (50PR/BX 4BX/CA)

## (undated) DEVICE — GLOVE, LITE (PAIR)

## (undated) DEVICE — MASK, LARYNGEAL AIRWAY #5

## (undated) DEVICE — SUTURE 4-0 PROLENE PS-2 18 (36PK/BX)"

## (undated) DEVICE — TRAY SRGPRP PVP IOD WT PRP - (20/CA)

## (undated) DEVICE — TUBING CLEARLINK DUO-VENT - C-FLO (48EA/CA)

## (undated) DEVICE — TUBE NG SALEM SUMP 16FR (50EA/CA)

## (undated) DEVICE — PADDING CAST 4 IN STERILE - 4 X 4 YDS (24/CA)

## (undated) DEVICE — DRAPE COLUMN  BOX OF 20

## (undated) DEVICE — ELECTRODE DUAL RETURN W/ CORD - (50/PK)

## (undated) DEVICE — CHLORAPREP 26 ML APPLICATOR - ORANGE TINT(25/CA)

## (undated) DEVICE — HUMID-VENT HEAT AND MOISTURE EXCHANGE- (50/BX)

## (undated) DEVICE — BAG, SPONGE COUNT 50600

## (undated) DEVICE — KIT CARPAL TUNNEL ENDOSCOPIC

## (undated) DEVICE — STOCKINET BIAS 4 IN STERILE - (20/CA)

## (undated) DEVICE — ANTI-FOG SOLUTION - 60BTL/CA

## (undated) DEVICE — STOCKINETTE, TUBULAR 3

## (undated) DEVICE — TUBE CONNECT SUCTION CLEAR 120 X 1/4" (50EA/CA)"

## (undated) DEVICE — DRAPE STRLE REG TOWEL 18X24 - (10/BX 4BX/CA)"

## (undated) DEVICE — TUBE CONNECTING SUCTION - CLEAR PLASTIC STERILE 72 IN (50EA/CA)

## (undated) DEVICE — PROTECTOR ULNA NERVE - (36PR/CA)

## (undated) DEVICE — CANISTER SUCTION RIGID RED 1500CC (40EA/CA)

## (undated) DEVICE — AGEE CARPAL TUNNEL RELEASE (6EA/PK)

## (undated) DEVICE — NEEDLE INSFL 120MM 14GA VRRS - (20/BX)

## (undated) DEVICE — Device

## (undated) DEVICE — SUTURE 4-0 MONOCRYL PLUS PS-2 - 27 INCH (36/BX)

## (undated) DEVICE — PAD PREP 24 X 48 CUFFED - (100/CA)

## (undated) DEVICE — SYSTEM CLEARIFY VISUALIZATION (10EA/PK)

## (undated) DEVICE — SET EXTENSION WITH 2 PORTS (48EA/CA) ***PART #2C8610 IS A SUBSTITUTE*****

## (undated) DEVICE — KIT ROOM DECONTAMINATION

## (undated) DEVICE — ADHESIVE DERMABOND HVD MINI (12EA/BX)

## (undated) DEVICE — PACK UPPER EXTREMITY SM OR - (3/CA)

## (undated) DEVICE — SEAL 5MM-8MM UNIVERSAL  BOX OF 10

## (undated) DEVICE — HEAD HOLDER JUNIOR/ADULT

## (undated) DEVICE — GOWN WARMING STANDARD FLEX - (30/CA)

## (undated) DEVICE — GLOVE BIOGEL SZ 7 SURGICAL PF LTX - (50PR/BX 4BX/CA)

## (undated) DEVICE — COVER TIP ENDOWRIST HOT SHEAR - (10EA/BX) DA VINCI

## (undated) DEVICE — SEAL CANNULA STAPLER 12 MM (10EA/BX)

## (undated) DEVICE — WATER IRRIGATION STERILE 1000ML (12EA/CA)

## (undated) DEVICE — DRAPE LARGE 3 QUARTER - (20/CA)

## (undated) DEVICE — MASK AIRWAY SIZE 4 UNIQUE SILICON (10EA/BX)

## (undated) DEVICE — NEPTUNE 4 PORT MANIFOLD - (20/PK)

## (undated) DEVICE — SLEEVE, VASO, THIGH, MED

## (undated) DEVICE — SENSOR SPO2 NEO LNCS ADHESIVE (20/BX) SEE USER NOTES

## (undated) DEVICE — SHEARS MONOPOLAR CURVED  DA VINCI 10X'S REUSABLE

## (undated) DEVICE — NEEDLE DRIVER MEGA SUTURECUT DA VINCI 15X'S REUSABLE

## (undated) DEVICE — SUTURE 2-0 30CM STRATAFIX SPIRAL PDO ***WAS PART #SXPD1B401 *****

## (undated) DEVICE — REDUCER XI STAPLER 12MM TO 8MM (6EA/BX)

## (undated) DEVICE — ROBOTIC SURGERY SERVICES

## (undated) DEVICE — GLOVE BIOGEL PI ULTRATOUCH SZ 7.0 SURGICAL PF LF- POWDER FREE (50/BX 4BX/CA)

## (undated) DEVICE — CANISTER SUCTION 3000ML MECHANICAL FILTER AUTO SHUTOFF MEDI-VAC NONSTERILE LF DISP  (40EA/CA)

## (undated) DEVICE — GLOVE BIOGEL PI ULTRATOUCH SZ 7.5 SURGICAL PF LF -(50/BX 4BX/CA)

## (undated) DEVICE — DRAPE ARM  BOX OF 20

## (undated) DEVICE — PACK LOWER EXTREMITY - (2/CA)

## (undated) DEVICE — BOVIE NEEDLE TIP INSULATD NON-SAFETY 2CM (50/PK)

## (undated) DEVICE — BIPOLAR FORCE DA VINCI 12X'S REISABLE

## (undated) DEVICE — OBTURATOR BLADELESS STANDARD 8MM (6EA/BX)